# Patient Record
Sex: FEMALE
[De-identification: names, ages, dates, MRNs, and addresses within clinical notes are randomized per-mention and may not be internally consistent; named-entity substitution may affect disease eponyms.]

---

## 2023-12-07 PROBLEM — Z00.00 ENCOUNTER FOR PREVENTIVE HEALTH EXAMINATION: Status: ACTIVE | Noted: 2023-12-07

## 2023-12-12 PROBLEM — D18.09 HEMANGIOMA OF VERTEBRAL BODY: Status: ACTIVE | Noted: 2023-12-12

## 2023-12-12 RX ORDER — MELOXICAM 10 MG/1
CAPSULE ORAL
Refills: 0 | Status: ACTIVE | COMMUNITY

## 2023-12-14 ENCOUNTER — APPOINTMENT (OUTPATIENT)
Dept: NEUROSURGERY | Facility: CLINIC | Age: 38
End: 2023-12-14
Payer: COMMERCIAL

## 2023-12-14 DIAGNOSIS — D18.09 HEMANGIOMA OF OTHER SITES: ICD-10-CM

## 2023-12-14 PROCEDURE — 99213 OFFICE O/P EST LOW 20 MIN: CPT | Mod: 95

## 2023-12-14 NOTE — ASSESSMENT
[FreeTextEntry1] : I, Dr. Lee, personally performed the evaluation and management (E/M) services for this new patient who presents via tele today with C5 hemangioma. Physical exam limited due to location of patient. That E/M includes conducting the assessing all new/exacerbated conditions and symptoms, reviewing imaging and establishing a new plan of care. Today, my ACP, Alicia Mancera, was here to observe my evaluation and management services for this new problem/exacerbated condition to be followed going forward.  PLAN: - f/u with neurology for pain management - PT for neck and shoulder _ take NSAIDS - recommend CTA-pt states she is already getting on in [place] - pt told to follow cervical hemangioma annually.

## 2023-12-14 NOTE — HISTORY OF PRESENT ILLNESS
[de-identified] : The patient is a 38 year old female mother of 3 boys under 10 years of age. Three years ago she began to experience pulsatile tinnitus, she went to a local ENT and was told she didn't need to be concerned. Tinnitus persists too today.  Additionally, she has known about a right femur AVM for 15 years but wanted to wait until after having children to have it fixed. In April and May of this year a vascular radiologist unsuccessfully attempted arterial and venous embolization at [facility] in [place]. During this time she was also found to have a smaller AVM in her left hand. Around the same time she began experiencing neck and right shoulder pain. 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 During this time a MRI/MRV brain was done that showed no AVM in brain but a C5 hemangioma.  TODAY: presents via tele to review images and discuss treatment options. She does plan of seeing Dr. Jerry Chaney MD Neurosurgeon at [facility]    never smoked occasionally drinks no marijuana kids: 3 5,8,10 boy  no longer works, prior:   Medication: meloxicam, birth control surgery: rotator, 3 section,  PMH: no allergies none numb tingling right arm pain down to fingers right handed can;t feed decreased ROM handwriting changed pain lower back MRI lumbar spine fainted

## 2023-12-14 NOTE — ADDENDUM
[FreeTextEntry1] : Patient Name: MONICA HUIZAR   Subjective:   (1) Chief Complaint: Concern about hemangioma of the spine, nerve pain in the arm, pulsatile tinnitus, spinal AVM, AVMs in the limbs, and arthritic pain.   (2) HPI: The patient is a 38-year-old female presented with complaints of nerve pain in her arm and neck following a rotator cuff surgery. She also mentioned that she has a hemangioma of the spine that was discovered during an unrelated MRI. She also reports having pulsatile tinnitus, and a possible AVM in her brain that requires further exploration. The patient previously has had embolizations on an AVM in her leg, which resulted unsuccessful.   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 Surgical history includes C-sections. 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 She experiences chronic constipation and is currently on anti-inflammatory medication for her shoulder pain.   (4) Review of Systems: Constant constipation and nerve pain in arm following a rotator cuff surgery.   (5) Current Medications: Anti-inflammatory medication for shoulder pain   (6) Allergies: None mentioned   Objective:   (1) Vital Signs: Not provided   (2) Physical Exam Findings: Visual inspection of the patient's hand showed discoloration. The patient reported stiffness in the neck and limitations in movement. Sensitivity and inflammation were also reported in the shoulder and neck following rotator cuff surgery.   (3) Laboratory data: Not provided   (4) Imaging Results: MRI of the spine showed a hemangioma. An unrelated brain MRI showed no AVM. A brain angiogram has been recommended for further exploration. An MRI of the spine is suggested for annual monitoring.   (5) Other Diagnostic Data: Not provided   (6) Other Clinicians: Patient is seen by another neurologist for angiogram of the brain and previously saw a spinal specialist.   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 Consultation with a Neurologist and Physical therapy recommended. Hemangioma of the spine doesn't seem to be causing problems but is recommended to be monitored via annual MRIs. Investigation of pulsatile tinnitus with the neurologist is necessary.   Plan:   (1) Item:      (a) Referral to pain specialist for nerve pain      (b) Recommend annual MRIs of the spine for the coming three to five years      (c) Physical therapy for Neck      (d) Consider seeing a  to ascertain syndrome type leading to peripheral AVMs      (e) Further neurological examination and possibly a CTA for investigation of AVM in brain versus tinnitus cause   (2) Summary: The plan is to manage the patient's nerve pain potentially via a pain specialist and physical therapy in the short term. In the long term, the patient will have annual MRIs to monitor her spinal hemangioma. AVM in the brain will be investigated further with the help of her neurologist and consider CTA scan first.  Patient Name: MONICA HUIZAR   Subjective:   (1) Chief Complaint: Neck pain   (2) HPI: Patient came in for review of cervical spine due to persistent neck pain. Provider examined the images of cervical spine and found a hemangioma which is confined within the bone, with no obvious involvement. Patient reported more pain on the right side of the neck.   (3) History: Not provided   (4) Review of Systems: Not provided   (5) Current Medications: Not provided   (6) Allergies: Not provided   Objective:   (1) Vital Signs: Not provided   (2) Physical Exam Findings: Provider noted hemangioma in cervical spine and greater pain on the right side of the neck. Hemangioma remains within the confines of the bone, not extending outside. No obvious involvements.   (3) Laboratory data: Not provided   (4) Imaging Results: Imaging results showed spinal cord in the center with spinal fluid around it, hemangioma was located at the front of the spine. It was evident that the hemangioma was not any larger than the vertebral bodies and was confined within the bone.   (5) Other Diagnostic Data: Not provided   (6) Other Clinicians: Not provided   Assessment:   (1) Problems:      (a) Cervical spine hemangioma      (b) Neck pain   (2) Differential Diagnosis:      (a) Cervical spine hemangioma   Plan:   (1) Item:      (a) Monitor any change in neck pain      (b) Yearly follow-up to keep track of hemangioma   (2) Summary: Provider advised patient to note any increase in the severity of neck pain as a possible symptom of hemangioma growth. The patient should follow up once a year for a few years with a healthcare provider to ensure the hemangiome is being appropriately monitored.
no